# Patient Record
Sex: FEMALE | Race: WHITE | NOT HISPANIC OR LATINO | URBAN - METROPOLITAN AREA
[De-identification: names, ages, dates, MRNs, and addresses within clinical notes are randomized per-mention and may not be internally consistent; named-entity substitution may affect disease eponyms.]

---

## 2017-04-19 PROBLEM — Z00.00 ENCOUNTER FOR PREVENTIVE HEALTH EXAMINATION: Status: ACTIVE | Noted: 2017-04-19

## 2019-03-04 ENCOUNTER — INPATIENT (INPATIENT)
Facility: HOSPITAL | Age: 29
LOS: 1 days | Discharge: HOME | End: 2019-03-06
Attending: OBSTETRICS & GYNECOLOGY | Admitting: OBSTETRICS & GYNECOLOGY
Payer: COMMERCIAL

## 2019-03-04 VITALS — HEART RATE: 93 BPM | DIASTOLIC BLOOD PRESSURE: 73 MMHG | SYSTOLIC BLOOD PRESSURE: 126 MMHG

## 2019-03-04 LAB
AMPHET UR-MCNC: NEGATIVE — SIGNIFICANT CHANGE UP
APPEARANCE UR: ABNORMAL
BACTERIA # UR AUTO: ABNORMAL /HPF
BARBITURATES UR SCN-MCNC: NEGATIVE — SIGNIFICANT CHANGE UP
BASOPHILS # BLD AUTO: 0.04 K/UL — SIGNIFICANT CHANGE UP (ref 0–0.2)
BASOPHILS NFR BLD AUTO: 0.3 % — SIGNIFICANT CHANGE UP (ref 0–1)
BENZODIAZ UR-MCNC: NEGATIVE — SIGNIFICANT CHANGE UP
BILIRUB UR-MCNC: NEGATIVE — SIGNIFICANT CHANGE UP
BLD GP AB SCN SERPL QL: SIGNIFICANT CHANGE UP
COCAINE METAB.OTHER UR-MCNC: NEGATIVE — SIGNIFICANT CHANGE UP
COLOR SPEC: YELLOW — SIGNIFICANT CHANGE UP
DIFF PNL FLD: ABNORMAL
EOSINOPHIL # BLD AUTO: 0.03 K/UL — SIGNIFICANT CHANGE UP (ref 0–0.7)
EOSINOPHIL NFR BLD AUTO: 0.3 % — SIGNIFICANT CHANGE UP (ref 0–8)
EPI CELLS # UR: ABNORMAL /HPF
GLUCOSE UR QL: NEGATIVE MG/DL — SIGNIFICANT CHANGE UP
HCT VFR BLD CALC: 39.1 % — SIGNIFICANT CHANGE UP (ref 37–47)
HGB BLD-MCNC: 13.5 G/DL — SIGNIFICANT CHANGE UP (ref 12–16)
IMM GRANULOCYTES NFR BLD AUTO: 1.1 % — HIGH (ref 0.1–0.3)
KETONES UR-MCNC: NEGATIVE — SIGNIFICANT CHANGE UP
LEUKOCYTE ESTERASE UR-ACNC: ABNORMAL
LYMPHOCYTES # BLD AUTO: 1.54 K/UL — SIGNIFICANT CHANGE UP (ref 1.2–3.4)
LYMPHOCYTES # BLD AUTO: 13 % — LOW (ref 20.5–51.1)
MCHC RBC-ENTMCNC: 30.1 PG — SIGNIFICANT CHANGE UP (ref 27–31)
MCHC RBC-ENTMCNC: 34.5 G/DL — SIGNIFICANT CHANGE UP (ref 32–37)
MCV RBC AUTO: 87.1 FL — SIGNIFICANT CHANGE UP (ref 81–99)
METHADONE UR-MCNC: NEGATIVE — SIGNIFICANT CHANGE UP
MONOCYTES # BLD AUTO: 0.85 K/UL — HIGH (ref 0.1–0.6)
MONOCYTES NFR BLD AUTO: 7.2 % — SIGNIFICANT CHANGE UP (ref 1.7–9.3)
NEUTROPHILS # BLD AUTO: 9.28 K/UL — HIGH (ref 1.4–6.5)
NEUTROPHILS NFR BLD AUTO: 78.1 % — HIGH (ref 42.2–75.2)
NITRITE UR-MCNC: NEGATIVE — SIGNIFICANT CHANGE UP
NRBC # BLD: 0 /100 WBCS — SIGNIFICANT CHANGE UP (ref 0–0)
OPIATES UR-MCNC: NEGATIVE — SIGNIFICANT CHANGE UP
PCP SPEC-MCNC: SIGNIFICANT CHANGE UP
PH UR: 7 — SIGNIFICANT CHANGE UP (ref 5–8)
PLATELET # BLD AUTO: 113 K/UL — LOW (ref 130–400)
PRENATAL SYPHILIS TEST: SIGNIFICANT CHANGE UP
PROPOXYPHENE QUALITATIVE URINE RESULT: NEGATIVE — SIGNIFICANT CHANGE UP
PROT UR-MCNC: 30 MG/DL
RBC # BLD: 4.49 M/UL — SIGNIFICANT CHANGE UP (ref 4.2–5.4)
RBC # FLD: 12.6 % — SIGNIFICANT CHANGE UP (ref 11.5–14.5)
RBC CASTS # UR COMP ASSIST: ABNORMAL /HPF
SP GR SPEC: <=1.005 — SIGNIFICANT CHANGE UP (ref 1.01–1.03)
TYPE + AB SCN PNL BLD: SIGNIFICANT CHANGE UP
UROBILINOGEN FLD QL: 0.2 MG/DL — SIGNIFICANT CHANGE UP (ref 0.2–0.2)
WBC # BLD: 11.87 K/UL — HIGH (ref 4.8–10.8)
WBC # FLD AUTO: 11.87 K/UL — HIGH (ref 4.8–10.8)
WBC UR QL: ABNORMAL /HPF

## 2019-03-04 PROCEDURE — 59400 OBSTETRICAL CARE: CPT

## 2019-03-04 RX ORDER — IBUPROFEN 200 MG
600 TABLET ORAL EVERY 6 HOURS
Qty: 0 | Refills: 0 | Status: DISCONTINUED | OUTPATIENT
Start: 2019-03-04 | End: 2019-03-06

## 2019-03-04 RX ORDER — SODIUM CHLORIDE 9 MG/ML
3 INJECTION INTRAMUSCULAR; INTRAVENOUS; SUBCUTANEOUS EVERY 8 HOURS
Qty: 0 | Refills: 0 | Status: DISCONTINUED | OUTPATIENT
Start: 2019-03-04 | End: 2019-03-06

## 2019-03-04 RX ORDER — DOCUSATE SODIUM 100 MG
100 CAPSULE ORAL
Qty: 0 | Refills: 0 | Status: DISCONTINUED | OUTPATIENT
Start: 2019-03-04 | End: 2019-03-06

## 2019-03-04 RX ORDER — ACETAMINOPHEN 500 MG
650 TABLET ORAL EVERY 6 HOURS
Qty: 0 | Refills: 0 | Status: DISCONTINUED | OUTPATIENT
Start: 2019-03-04 | End: 2019-03-06

## 2019-03-04 RX ORDER — NALOXONE HYDROCHLORIDE 4 MG/.1ML
0.1 SPRAY NASAL
Qty: 0 | Refills: 0 | Status: DISCONTINUED | OUTPATIENT
Start: 2019-03-04 | End: 2019-03-04

## 2019-03-04 RX ORDER — OXYTOCIN 10 UNIT/ML
41.67 VIAL (ML) INJECTION
Qty: 20 | Refills: 0 | Status: DISCONTINUED | OUTPATIENT
Start: 2019-03-04 | End: 2019-03-06

## 2019-03-04 RX ORDER — MAGNESIUM HYDROXIDE 400 MG/1
30 TABLET, CHEWABLE ORAL
Qty: 0 | Refills: 0 | Status: DISCONTINUED | OUTPATIENT
Start: 2019-03-04 | End: 2019-03-06

## 2019-03-04 RX ORDER — LANOLIN
1 OINTMENT (GRAM) TOPICAL EVERY 6 HOURS
Qty: 0 | Refills: 0 | Status: DISCONTINUED | OUTPATIENT
Start: 2019-03-04 | End: 2019-03-06

## 2019-03-04 RX ORDER — OXYCODONE AND ACETAMINOPHEN 5; 325 MG/1; MG/1
2 TABLET ORAL EVERY 6 HOURS
Qty: 0 | Refills: 0 | Status: DISCONTINUED | OUTPATIENT
Start: 2019-03-04 | End: 2019-03-06

## 2019-03-04 RX ORDER — SODIUM CHLORIDE 9 MG/ML
1000 INJECTION, SOLUTION INTRAVENOUS
Qty: 0 | Refills: 0 | Status: DISCONTINUED | OUTPATIENT
Start: 2019-03-04 | End: 2019-03-04

## 2019-03-04 RX ORDER — SODIUM CHLORIDE 9 MG/ML
125 INJECTION, SOLUTION INTRAVENOUS ONCE
Qty: 0 | Refills: 0 | Status: DISCONTINUED | OUTPATIENT
Start: 2019-03-04 | End: 2019-03-04

## 2019-03-04 RX ORDER — AER TRAVELER 0.5 G/1
1 SOLUTION RECTAL; TOPICAL EVERY 4 HOURS
Qty: 0 | Refills: 0 | Status: DISCONTINUED | OUTPATIENT
Start: 2019-03-04 | End: 2019-03-06

## 2019-03-04 RX ORDER — BENZOCAINE 10 %
1 GEL (GRAM) MUCOUS MEMBRANE EVERY 6 HOURS
Qty: 0 | Refills: 0 | Status: DISCONTINUED | OUTPATIENT
Start: 2019-03-04 | End: 2019-03-06

## 2019-03-04 RX ORDER — PRAMOXINE HYDROCHLORIDE 150 MG/15G
1 AEROSOL, FOAM RECTAL EVERY 4 HOURS
Qty: 0 | Refills: 0 | Status: DISCONTINUED | OUTPATIENT
Start: 2019-03-04 | End: 2019-03-06

## 2019-03-04 RX ORDER — SIMETHICONE 80 MG/1
80 TABLET, CHEWABLE ORAL EVERY 6 HOURS
Qty: 0 | Refills: 0 | Status: DISCONTINUED | OUTPATIENT
Start: 2019-03-04 | End: 2019-03-06

## 2019-03-04 RX ORDER — OXYTOCIN 10 UNIT/ML
41.67 VIAL (ML) INJECTION
Qty: 20 | Refills: 0 | Status: DISCONTINUED | OUTPATIENT
Start: 2019-03-04 | End: 2019-03-04

## 2019-03-04 RX ORDER — GLYCERIN ADULT
1 SUPPOSITORY, RECTAL RECTAL AT BEDTIME
Qty: 0 | Refills: 0 | Status: DISCONTINUED | OUTPATIENT
Start: 2019-03-04 | End: 2019-03-06

## 2019-03-04 RX ORDER — OXYTOCIN 10 UNIT/ML
2 VIAL (ML) INJECTION
Qty: 30 | Refills: 0 | Status: DISCONTINUED | OUTPATIENT
Start: 2019-03-04 | End: 2019-03-04

## 2019-03-04 RX ORDER — ONDANSETRON 8 MG/1
4 TABLET, FILM COATED ORAL EVERY 6 HOURS
Qty: 0 | Refills: 0 | Status: DISCONTINUED | OUTPATIENT
Start: 2019-03-04 | End: 2019-03-04

## 2019-03-04 RX ORDER — DIPHENHYDRAMINE HCL 50 MG
25 CAPSULE ORAL EVERY 6 HOURS
Qty: 0 | Refills: 0 | Status: DISCONTINUED | OUTPATIENT
Start: 2019-03-04 | End: 2019-03-06

## 2019-03-04 RX ORDER — DIBUCAINE 1 %
1 OINTMENT (GRAM) RECTAL EVERY 4 HOURS
Qty: 0 | Refills: 0 | Status: DISCONTINUED | OUTPATIENT
Start: 2019-03-04 | End: 2019-03-06

## 2019-03-04 RX ADMIN — Medication 2 MILLIUNIT(S)/MIN: at 15:10

## 2019-03-04 NOTE — OB PROVIDER H&P - HISTORY OF PRESENT ILLNESS
29 yo  at 40w4d w/ SURESH of 2019 by LMP consistent with 1st trimester sonogram here for IOL for PROM. Pt reports LOF that started at 1000, clear, nonodorous, soaked a pad, still leaking. Reports contractions that started last night, irregular in frequency, 4/10 intensity. Denies VB. Feels good FM. No complications in this pregnancy other than gestational thrombocytopenia, which she also had in previous three pregnancies. Last PMD visit was today, VE was done, cervix was 1-2 cm dilated, rupture of membranes was confirmed.

## 2019-03-04 NOTE — CHART NOTE - NSCHARTNOTEFT_GEN_A_CORE
Consulted by OB Resident to evaluate patient for Labor Analgesia, in particular Labor Epidural.  Patient's chart, notes and labs reviewed.  IOL for SROM @ approximately 09:00 AM.                          13.5   11.87 )-----------( 113      ( 04 Mar 2019 13:43 )             39.1     Witnessed, written, informed Anesthesia Consent obtained for Epidural/Spinal/General Anesthesia.

## 2019-03-04 NOTE — OB PROVIDER H&P - NSHPLABSRESULTS_GEN_ALL_CORE
Last platelet count on 2/7/2019 was 82    GCT 92    Sonos:  20w:  grams (35%), afv normal, 3vc, anterior placenta  21w4d: AGA (25%), anterior placenta, afv nl, CL 3.4 cm, anatomy wnl

## 2019-03-04 NOTE — OB PROVIDER DELIVERY SUMMARY - NSPROVIDERDELIVERYNOTE_OBGYN_ALL_OB_FT
Patient was fully dilated and pushing. Fetal head was OA and restituted to LOT. The anterior and posterior shoulders delivered, followed by the remaining body atraumatically. Delayed cord clamping was performed, and then clamped and cut. Cord blood gases collected x2. The  was handed to the mother. The placenta delivered intact with membranes. Pitocin was administered. Uterus massaged, fundus found to be firm. Cervix, vagina and perineum inspected. No lacerations with good hemostasis.     Viable female infant delivered with APGARs 9/9    Laceration:   EBL 300cc

## 2019-03-04 NOTE — OB RN PATIENT PROFILE - FAMILY HISTORY
Father  Still living? Unknown  Family history of hypertension, Age at diagnosis: Age Unknown  Family history of hypercholesterolemia, Age at diagnosis: Age Unknown     Mother  Still living? Unknown  Family history of hypertension, Age at diagnosis: Age Unknown

## 2019-03-04 NOTE — OB PROVIDER H&P - NS_OBGYNHISTORY_OBGYN_ALL_OB_FT
OB Hx: FT  x3, largest baby 7-9lbs, no complications other than gestational thrombocytopenia  GYN Hx: denies h/o fibroids, ovarian cysts, abnormal paps and STIs

## 2019-03-04 NOTE — OB PROVIDER H&P - ASSESSMENT
-Needs MMR PP  -Peds to be made aware regarding thrombocytopenia  -F/u manual count of platelets 29 yo  at 40w4d, GBS neg, PROM at 1000, clear, w/ gestational thrombocytopenia, IOL for PPROM,     -Admit  -Admission labs  -IVF  -Continuous EFM/toco  -Monitor VS per routine  -Pain management prn  -Needs MMR PP  -Peds to be made aware regarding thrombocytopenia  -F/u manual count of platelets    Dr. Busby aware and Dr. Randolph to be made aware.

## 2019-03-04 NOTE — OB PROVIDER H&P - NSHPPHYSICALEXAM_GEN_ALL_CORE
Vital Signs Last 24 Hrs  T(C): 37.2 (04 Mar 2019 13:26), Max: 37.2 (04 Mar 2019 13:26)  T(F): 99 (04 Mar 2019 13:26), Max: 99 (04 Mar 2019 13:26)  HR: 93 (04 Mar 2019 13:26) (93 - 93)  BP: 126/73 (04 Mar 2019 13:26) (126/73 - 126/73)  RR: 18 (04 Mar 2019 13:26) (18 - 18)    Udip: moderate blood   EFM: 140/mod/accel+  [place]: q7-8min  SVE: 1-2 cm dilated in the office today  Abd:   EFW by Leopold's: Vital Signs Last 24 Hrs  T(C): 37.2 (04 Mar 2019 13:26), Max: 37.2 (04 Mar 2019 13:26)  T(F): 99 (04 Mar 2019 13:26), Max: 99 (04 Mar 2019 13:26)  HR: 93 (04 Mar 2019 13:26) (93 - 93)  BP: 126/73 (04 Mar 2019 13:26) (126/73 - 126/73)  RR: 18 (04 Mar 2019 13:26) (18 - 18)    Udip: moderate blood   EFM: 140/mod/accel+  [place]: q7-8min  SVE: 1-2 cm dilated in the office today, vertex by sono  Abd: NT, gravid, no palpable contractions  EFW by Leopold's: 3100 grams

## 2019-03-05 LAB
BASOPHILS # BLD AUTO: 0.06 K/UL — SIGNIFICANT CHANGE UP (ref 0–0.2)
BASOPHILS NFR BLD AUTO: 0.4 % — SIGNIFICANT CHANGE UP (ref 0–1)
EOSINOPHIL # BLD AUTO: 0.06 K/UL — SIGNIFICANT CHANGE UP (ref 0–0.7)
EOSINOPHIL NFR BLD AUTO: 0.4 % — SIGNIFICANT CHANGE UP (ref 0–8)
HCT VFR BLD CALC: 36.9 % — LOW (ref 37–47)
HGB BLD-MCNC: 12.5 G/DL — SIGNIFICANT CHANGE UP (ref 12–16)
IMM GRANULOCYTES NFR BLD AUTO: 0.9 % — HIGH (ref 0.1–0.3)
LYMPHOCYTES # BLD AUTO: 1.83 K/UL — SIGNIFICANT CHANGE UP (ref 1.2–3.4)
LYMPHOCYTES # BLD AUTO: 13.6 % — LOW (ref 20.5–51.1)
MCHC RBC-ENTMCNC: 30 PG — SIGNIFICANT CHANGE UP (ref 27–31)
MCHC RBC-ENTMCNC: 33.9 G/DL — SIGNIFICANT CHANGE UP (ref 32–37)
MCV RBC AUTO: 88.7 FL — SIGNIFICANT CHANGE UP (ref 81–99)
MONOCYTES # BLD AUTO: 1.02 K/UL — HIGH (ref 0.1–0.6)
MONOCYTES NFR BLD AUTO: 7.6 % — SIGNIFICANT CHANGE UP (ref 1.7–9.3)
NEUTROPHILS # BLD AUTO: 10.4 K/UL — HIGH (ref 1.4–6.5)
NEUTROPHILS NFR BLD AUTO: 77.1 % — HIGH (ref 42.2–75.2)
NRBC # BLD: 0 /100 WBCS — SIGNIFICANT CHANGE UP (ref 0–0)
PLATELET # BLD AUTO: 111 K/UL — LOW (ref 130–400)
RBC # BLD: 4.16 M/UL — LOW (ref 4.2–5.4)
RBC # FLD: 12.6 % — SIGNIFICANT CHANGE UP (ref 11.5–14.5)
WBC # BLD: 13.49 K/UL — HIGH (ref 4.8–10.8)
WBC # FLD AUTO: 13.49 K/UL — HIGH (ref 4.8–10.8)

## 2019-03-05 RX ORDER — ZOLPIDEM TARTRATE 10 MG/1
5 TABLET ORAL AT BEDTIME
Qty: 0 | Refills: 0 | Status: DISCONTINUED | OUTPATIENT
Start: 2019-03-05 | End: 2019-03-06

## 2019-03-05 RX ADMIN — SODIUM CHLORIDE 3 MILLILITER(S): 9 INJECTION INTRAMUSCULAR; INTRAVENOUS; SUBCUTANEOUS at 00:42

## 2019-03-05 RX ADMIN — Medication 1 TABLET(S): at 10:54

## 2019-03-05 RX ADMIN — SODIUM CHLORIDE 3 MILLILITER(S): 9 INJECTION INTRAMUSCULAR; INTRAVENOUS; SUBCUTANEOUS at 06:08

## 2019-03-05 RX ADMIN — Medication 600 MILLIGRAM(S): at 09:18

## 2019-03-05 RX ADMIN — Medication 600 MILLIGRAM(S): at 18:57

## 2019-03-05 NOTE — PROGRESS NOTE ADULT - SUBJECTIVE AND OBJECTIVE BOX
OB attending  PPD #1    Pt doing well, pain well controlled. No overnight events, no acute complaints.    Ambulating: Yes  Voiding: Yes  Flatus: Yes  Bowel movements: Yes   Breast or bottle feeding: Breastfeeding  Diet: Regular    PAST MEDICAL & SURGICAL HISTORY:  No pertinent past medical history  No significant past surgical history      Physical Exam  Vital Signs Last 24 Hrs  T(C): 36.6 (05 Mar 2019 15:14), Max: 36.9 (04 Mar 2019 18:34)  T(F): 97.9 (05 Mar 2019 15:14), Max: 98.42 (04 Mar 2019 18:34)  HR: 68 (05 Mar 2019 15:14) (68 - 110)  BP: 110/61 (05 Mar 2019 15:14) (110/58 - 149/67)  RR: 20 (05 Mar 2019 15:14) (18 - 20)  Gen: AAOx3, NAD  Abd: Soft, nontender, nondistended, BS+  Fundus: Firm, below umbilicus  Lochia: normal  Ext: No calf tenderness, no swelling    Labs:                        12.5   13.49 )-----------( 111      ( 05 Mar 2019 11:55 )             36.9     Rh+    A/P: s/p , PPD #1, doing well, one isolated elevated bp, platelet count good, patient with history gestational thrombocytopenia    - continue current management, Ambien prn sleep  -d/c in am   -MMr at d/c

## 2019-03-06 ENCOUNTER — TRANSCRIPTION ENCOUNTER (OUTPATIENT)
Age: 29
End: 2019-03-06

## 2019-03-06 VITALS
HEART RATE: 80 BPM | DIASTOLIC BLOOD PRESSURE: 61 MMHG | SYSTOLIC BLOOD PRESSURE: 113 MMHG | TEMPERATURE: 97 F | RESPIRATION RATE: 20 BRPM

## 2019-03-06 RX ORDER — ACETAMINOPHEN 500 MG
2 TABLET ORAL
Qty: 0 | Refills: 0 | DISCHARGE
Start: 2019-03-06

## 2019-03-06 RX ORDER — IBUPROFEN 200 MG
1 TABLET ORAL
Qty: 0 | Refills: 0 | DISCHARGE
Start: 2019-03-06

## 2019-03-06 RX ADMIN — Medication 600 MILLIGRAM(S): at 11:32

## 2019-03-06 RX ADMIN — Medication 0.5 MILLILITER(S): at 11:19

## 2019-03-06 NOTE — DISCHARGE NOTE OB - MEDICATION SUMMARY - MEDICATIONS TO TAKE
I will START or STAY ON the medications listed below when I get home from the hospital:    acetaminophen 325 mg oral tablet  -- 2 tab(s) by mouth every 6 hours, As needed, Temp greater or equal to 38.5C (101.3F), Mild Pain (1 - 3)  -- Indication: For postpartum    ibuprofen 600 mg oral tablet  -- 1 tab(s) by mouth every 6 hours, As needed, Moderate Pain (4 - 6)  -- Indication: For postpartum

## 2019-03-06 NOTE — PROGRESS NOTE ADULT - SUBJECTIVE AND OBJECTIVE BOX
Subjective:   Patient doing well. No complaints. Minimal lochia. Pain controlled.    Objective:   T(F): 97.3 ( @ 07:22), Max: 97.9 (05 @ 15:14)  HR: 80 ( @ 07:22)  BP: 113/61 (- @ 07:22) (97/56 - 155/99)  RR: 20 ( @ 07:22)  SpO2: --  Gen: AAOx3, NAD  Abd: Soft, Nontender, Nondistended, Fundus firm below the umbilicus  Ext: no tender, mild edema  Min Lochia Rubra    Labs:                        12.5   13.49 )-----------( 111      ( 05 Mar 2019 11:55 )             36.9             Tolerating regular diet  Passed flatus, passed bowel movement  Breast/Bottle feeding    Assessment:   28y s/p , PPD#2, doing well    Plan:  -Routine postpartum care  -Encouraged ambulation and PO hydration  -Tolerating regular diet  -For MMR

## 2019-03-06 NOTE — DISCHARGE NOTE OB - CARE PROVIDER_API CALL
Fletcher Greer)  Obstetrics and Gynecology  [address] [place] [address]  Phone: [phone]  Fax: [phone]  Follow Up Time:

## 2019-03-13 DIAGNOSIS — R03.0 ELEVATED BLOOD-PRESSURE READING, WITHOUT DIAGNOSIS OF HYPERTENSION: ICD-10-CM

## 2019-03-13 DIAGNOSIS — O99.89 OTHER SPECIFIED DISEASES AND CONDITIONS COMPLICATING PREGNANCY, CHILDBIRTH AND THE PUERPERIUM: ICD-10-CM

## 2019-03-13 DIAGNOSIS — Z3A.40 40 WEEKS GESTATION OF PREGNANCY: ICD-10-CM

## 2019-03-13 DIAGNOSIS — D69.6 THROMBOCYTOPENIA, UNSPECIFIED: ICD-10-CM

## 2019-03-13 DIAGNOSIS — O42.92 FULL-TERM PREMATURE RUPTURE OF MEMBRANES, UNSPECIFIED AS TO LENGTH OF TIME BETWEEN RUPTURE AND ONSET OF LABOR: ICD-10-CM

## 2021-11-01 ENCOUNTER — APPOINTMENT (OUTPATIENT)
Dept: ANTEPARTUM | Facility: CLINIC | Age: 31
End: 2021-11-01
Payer: COMMERCIAL

## 2021-11-01 ENCOUNTER — ASOB RESULT (OUTPATIENT)
Age: 31
End: 2021-11-01

## 2021-11-01 PROCEDURE — 76811 OB US DETAILED SNGL FETUS: CPT

## 2022-02-09 NOTE — OB RN PATIENT PROFILE - TOBACCO USE
"Patient Education       Colon Polyps   The Basics   Written by the doctors and editors at [facility]   What are colon polyps? -- Colon polyps are tiny growths that form on the inside of the large intestine (also known as the colon) (figure 1). Polyps are very common. About one-third to one-half of all adults have them by the time they are 50 years old. They do not usually cause symptoms. But some polyps can be or become cancer, so doctors sometimes remove them.  What are the symptoms of colon polyps? -- Colon polyps do not usually cause symptoms.  How do doctors find colon polyps? -- Doctors usually find colon polyps when they are doing screening tests to check for colon or rectal cancer. Cancer screening tests are tests that are done to try and find cancer early, before a person has symptoms. The screening tests for colon and rectal cancer include:  · Colonoscopy - Before having a colonoscopy, you will get medicine to help you relax. Then a doctor will put a thin tube into your anus and advance it into your colon (figure 2). The tube has a camera attached to it, so the doctor can look inside your colon. The tube also has tools on the end, so the doctor can remove pieces of tissue, including polyps. After polyps are removed, they usually go to a lab to be tested for cancer and other problems.  · Sigmoidoscopy - A sigmoidoscopy is very similar to a colonoscopy. The only difference is that this test looks only at the first part of the colon, and a colonoscopy looks at the whole colon.  · CT colonography (also known as virtual colonoscopy) - For a virtual colonoscopy, you have a special kind of X-ray taken, called a "CT scan." This test creates pictures of the colon.  · Stool test - "Stool" is another word for "bowel movements." Stool tests check for blood or abnormal genes in samples of stool. If a stool test indicates that something might be wrong with the colon, doctors usually follow up with a colonoscopy. Then " "doctors find polyps, if they are there.  · Capsule colonoscopy - Rarely, your doctor might do something called a "capsule" colonoscopy. For this test, you swallow a special capsule that contains tiny wireless video cameras.   How are colon polyps treated? -- Doctors remove polyps using the same tools they use for a colonoscopy. They can remove polyps either by snipping them off with a special cutting tool, or by catching the polyps in a noose (figure 3). Most polyps can be removed during a colonoscopy. But sometimes, large polyps need to be removed at a later time, either with another colonoscopy or with surgery.  What happens after I have polyps removed? -- You might need to have a colonoscopy every few years to check for more polyps. In some people polyps come back. And if you had the kind of polyps that could become cancer, your doctor will want to remove them as they appear. Also, if the polyps you had removed were the kind that could become cancer, people in your family might need to be checked for polyps and colon cancer earlier than if you did not have polyps.  Depending on your situation, your doctor might suggest genetic testing. This can show if your polyps are related to a specific gene that runs in families. If this turns out to be the case, they might recommend other tests that can be done to prevent cancer or find it early.  Can colon polyps be prevented? -- To reduce your chances of getting polyps or colon cancer:  · Eat a diet that is low in fat and high in fruits, vegetables, and fiber  · Lose weight, if you are overweight  · Do not smoke  · Limit the amount of alcohol you drink  All topics are updated as new evidence becomes available and our peer review process is complete.  This topic retrieved from [employer] on: Sep 21, 2021.  Topic 90147 Version 8.0  Release: 29.4.2 - C29.263  © 2021 UpToDate, Inc. and/or its affiliates. All rights reserved.  figure 1: Digestive system     This drawing shows the " "organs in the body that process food. Together these organs are called "the digestive system," or "digestive tract." As food travels through this system, the body absorbs nutrients and water.  Graphic 35310 Version 4.0    figure 2: Colonoscopy     During a colonoscopy, you lie on your side and the doctor puts a thin tube with a camera into your anus (from behind). Then the doctor advances the tube into the rectum and colon. The camera sends pictures from inside your colon to a television screen.  Graphic 80218 Version 6.0    figure 3: Removing a colon polyp     One way doctors remove colon polyps is to use a noose as a tool. They loop a wire around the polyp and squeeze the loop tight. When the polyp comes off, the doctor sucks it up into the endoscope, so that it can go to the lab for tests.  Graphic 35293 Version 5.0    Consumer Information Use and Disclaimer   This information is not specific medical advice and does not replace information you receive from your health care provider. This is only a brief summary of general information. It does NOT include all information about conditions, illnesses, injuries, tests, procedures, treatments, therapies, discharge instructions or life-style choices that may apply to you. You must talk with your health care provider for complete information about your health and treatment options. This information should not be used to decide whether or not to accept your health care provider's advice, instructions or recommendations. Only your health care provider has the knowledge and training to provide advice that is right for you. The use of this information is governed by the [employer] End User License Agreement, available at https://www.[employer].PlumTV/en/solutions/Yuenimei/about/nelly.The use of [employer] content is governed by the [employer] Terms of Use. ©2021 UpToDate, Inc. All rights reserved.  Copyright   © 2021 UpToDate, Inc. and/or its affiliates. All rights " reserved.  Patient Education       High Fiber Diet   About this topic   Dietary fiber helps many illnesses. It can help you if you cannot have a bowel movement or if you have loose stools. Fiber can also lower your risk of diabetes and heart disease. Fiber can help with weight loss by helping you feel roberts after meals.  You can find fiber in fruits, vegetables, nuts and seeds, whole grains, and legumes. The fiber is the part of the plant food that your body cannot break down and absorb. It passes through your stomach, small bowel, colon, and out your body.  There are two kinds of fiber: Insoluble and soluble fiber. Insoluble fiber helps you pass foods through your digestive system. Insoluble fiber can help you with hard stools. Soluble fiber draws water in and turns it into a gel-like form making digestion slow down. Both are important.       What will the results be?   A high fiber diet can help you with bowel problems like stools that are too hard or too loose. It can also help prevent hemorrhoids and other colon problems. A high fiber diet can also help control your weight and lower blood sugar and cholesterol levels.  What changes to diet are needed?   · The amount of fiber you need is based on your age, gender, and health.  · Try to get 20 to 35 grams of fiber in your diet each day. Most people in the [place] only eat 15 grams of fiber daily.  · Drink at least 8 cups (1920 mL) of fluid each day.  When is this diet used?   Your doctor may talk with you about this diet if you have belly problems.  Who should use this diet?   Older children, young people, and adults can have this diet.   Who should not use this diet?   Some people should not use this diet. Check with your doctor if you have:  · Diverticulitis  · Active Crohn's disease  · Ulcerative colitis  · Bowel inflammation  · Certain types of GI surgery  Talk to your child's doctor before starting your child on a high fiber diet.  What foods are good to eat?    To get the most from fiber in your diet, eat a wide variety of high fiber foods. Some examples are:  · Vegetables like:  ? Spinach  ? Peas  ? Artichoke  ? Sweet potatoes with skin  ? Broccoli  · Fruits like:  ? Raspberries  ? Blueberries  ? Blackberries  ? Apples with skin  ? Dried fruits  · Grains like:  ? Oat bran  ? Barley  ? Whole wheat products  ? Wheat bran  · Dried beans and nuts like:  ? Sunflower seeds  ? Almonds  ? Black beans  ? Chickpeas  What problems could happen?   · Sudden increase of fiber intake can lead to gas, pain, fullness in your belly, and loose stools. Increase fiber gradually while drinking plenty of fluids.  · Do not eat too much fiber. Your body will not take in vitamins and minerals as well if you eat too much fiber.  When do I need to call the doctor?   Health problem is not better or you are feeling worse.  Helpful tips   · Start slow as you add more fiber to your diet. This may help prevent gas or cramps.  · Try to eat the same amount of fiber each day. Aim to get your fiber from nutritious foods. Supplements do not offer the same benefits as food.  · Read food labels with care to learn how much fiber is in the food you are eating.  · If possible, do not peel fruits or vegetables before you eat them. Eating the peel gives you more fiber.  Where can I learn more?   Eat Right  https://www.eatright.org/food/vitamins-and-supplements/types-of-vitamins-and-nutrients/easy-ways-to-boost-fiber-in-your-daily-diet   Last Reviewed Date   2021-09-23  Consumer Information Use and Disclaimer   This information is not specific medical advice and does not replace information you receive from your health care provider. This is only a brief summary of general information. It does NOT include all information about conditions, illnesses, injuries, tests, procedures, treatments, therapies, discharge instructions or life-style choices that may apply to you. You must talk with your health care provider for  "complete information about your health and treatment options. This information should not be used to decide whether or not to accept your health care providers advice, instructions or recommendations. Only your health care provider has the knowledge and training to provide advice that is right for you.  Copyright   Copyright © 2021 UpToDate, Inc. and its affiliates and/or licensors. All rights reserved.  Patient Education       Diverticulosis Discharge Instructions   About this topic   Diverticulosis is a problem of the large bowel or colon. The wall of the bowel becomes weak and pushes outward. They form balloon-like pouches called diverticula or "tics." When you have hard stool, you strain to have a bowel movement. This raises the pressure in the bowel and causes pouches or bulges to form. Most often, they do not cause a problem. If they become infected, you have diverticulitis. If you have both bleeding and infection it is diverticular disease.     What care is needed at home?   · Ask your doctor what you need to do when you go home. Make sure you ask questions if you do not understand what the doctor says.  · Eat more whole grains, vegetables, and fruits.  · Do not wait to have a bowel movement. Go as soon as you have the urge.  · Drink 8 to 10 glasses of water each day. Talk to your doctor if you are drinking less fluids due to a health problem.  · Be active. Walk, garden, or do something active for 30 minutes or more on most days of the week.  What follow-up care is needed?   Your doctor may ask you to make visits to the office to check on your progress. Be sure to keep these visits.  What drugs may be needed?   Most often with diverticulosis you will not need to take any drugs.  Will physical activity be limited?   When you are in pain, you may need to rest in bed. To ease the pain, use a heat compress on your belly. This should last only for a few days.  What changes to diet are needed?   Talk to your " doctor about any changes you need to make to your diet.  · You do not need to avoid seeds, nuts, corn, or other similar foods.   · You will need to eat food rich in fiber and drink more water.  ? Eat 5 or more servings of fresh fruits and vegetables every day.  ? Eat 6 or more servings of whole-wheat grain breads and cereals.  ? Try to get 25 to 30 grams of fiber every day. Read the labels to learn how much fiber is in foods.   · Do not drink coffee, tea, or beer, wine, and mixed drinks (alcohol).  What problems could happen?   You may develop diverticulitis, which may cause:  · Pockets or pouches in your bowel may be infected or filled with pus.  · Hole or tear in your bowel  · Part of your bowel to become narrow  · You to need surgery  What can be done to prevent this health problem?   The best way to keep from having diverticulosis is to keep your bowel movements soft and normal. To keep more pouches from forming:  · Talk with your doctor about adding an over-the-counter (OTC) fiber product to keep your stools soft.  · Limit how much pain drugs you take. Overuse of some pain drugs can cause hard stools; talk with your doctor.  When do I need to call the doctor?   · Signs of infection. These include a fever of 100.4°F (38°C) or higher, chills.  · Mild pain or cramping in the lower part of the belly  · A feeling of bloating in the belly  · Belly pain that gets worse  · Blood in your stool  · Upset stomach or throwing up  · Stools get too loose or too hard  · Long-term hard stools  Teach Back: Helping You Understand   The Teach Back Method helps you understand the information we are giving you. After you talk with the staff, tell them in your own words what you learned. This helps to make sure the staff has described each thing clearly. It also helps to explain things that may have been confusing. Before going home, make sure you are able to do these:  · I can tell you about my condition.  · I can tell you what  changes I need to make with my diet or drugs.  · I can tell you what I will do if I have pain or cramping in my lower belly or I have more belly pain.  Where can I learn more?   FamilyDoctor.org  http://familydoctor.org/familydoctor/en/diseases-conditions/diverticular-disease.html   [facility]  https://www.nhs.uk/conditions/diverticular-disease-and-diverticulitis/   Last Reviewed Date   2021-04-13  Consumer Information Use and Disclaimer   This information is not specific medical advice and does not replace information you receive from your health care provider. This is only a brief summary of general information. It does NOT include all information about conditions, illnesses, injuries, tests, procedures, treatments, therapies, discharge instructions or life-style choices that may apply to you. You must talk with your health care provider for complete information about your health and treatment options. This information should not be used to decide whether or not to accept your health care providers advice, instructions or recommendations. Only your health care provider has the knowledge and training to provide advice that is right for you.  Copyright   Copyright © 2021 UpToDate, Inc. and its affiliates and/or licensors. All rights reserved.     Never smoker

## 2022-03-21 ENCOUNTER — INPATIENT (INPATIENT)
Facility: HOSPITAL | Age: 32
LOS: 0 days | Discharge: HOME | End: 2022-03-22
Attending: OBSTETRICS & GYNECOLOGY | Admitting: OBSTETRICS & GYNECOLOGY
Payer: MEDICAID

## 2022-03-21 VITALS — HEART RATE: 96 BPM | SYSTOLIC BLOOD PRESSURE: 135 MMHG | TEMPERATURE: 99 F | DIASTOLIC BLOOD PRESSURE: 80 MMHG

## 2022-03-21 LAB
AMPHET UR-MCNC: NEGATIVE — SIGNIFICANT CHANGE UP
APPEARANCE UR: ABNORMAL
BACTERIA # UR AUTO: SIGNIFICANT CHANGE UP
BARBITURATES UR SCN-MCNC: NEGATIVE — SIGNIFICANT CHANGE UP
BASOPHILS # BLD AUTO: 0.06 K/UL — SIGNIFICANT CHANGE UP (ref 0–0.2)
BASOPHILS NFR BLD AUTO: 0.5 % — SIGNIFICANT CHANGE UP (ref 0–1)
BENZODIAZ UR-MCNC: NEGATIVE — SIGNIFICANT CHANGE UP
BILIRUB UR-MCNC: NEGATIVE — SIGNIFICANT CHANGE UP
BLD GP AB SCN SERPL QL: SIGNIFICANT CHANGE UP
BUPRENORPHINE SCREEN, URINE RESULT: NEGATIVE — SIGNIFICANT CHANGE UP
COCAINE METAB.OTHER UR-MCNC: NEGATIVE — SIGNIFICANT CHANGE UP
COLOR SPEC: SIGNIFICANT CHANGE UP
DIFF PNL FLD: NEGATIVE — SIGNIFICANT CHANGE UP
EOSINOPHIL # BLD AUTO: 0.03 K/UL — SIGNIFICANT CHANGE UP (ref 0–0.7)
EOSINOPHIL NFR BLD AUTO: 0.2 % — SIGNIFICANT CHANGE UP (ref 0–8)
EPI CELLS # UR: 25 /HPF — HIGH (ref 0–5)
FENTANYL UR QL: NEGATIVE — SIGNIFICANT CHANGE UP
GLUCOSE UR QL: NEGATIVE — SIGNIFICANT CHANGE UP
HCT VFR BLD CALC: 42 % — SIGNIFICANT CHANGE UP (ref 37–47)
HGB BLD-MCNC: 14.2 G/DL — SIGNIFICANT CHANGE UP (ref 12–16)
IMM GRANULOCYTES NFR BLD AUTO: 1.7 % — HIGH (ref 0.1–0.3)
KETONES UR-MCNC: NEGATIVE — SIGNIFICANT CHANGE UP
L&D DRUG SCREEN, URINE: SIGNIFICANT CHANGE UP
LEUKOCYTE ESTERASE UR-ACNC: ABNORMAL
LYMPHOCYTES # BLD AUTO: 1.28 K/UL — SIGNIFICANT CHANGE UP (ref 1.2–3.4)
LYMPHOCYTES # BLD AUTO: 9.8 % — LOW (ref 20.5–51.1)
MCHC RBC-ENTMCNC: 29.4 PG — SIGNIFICANT CHANGE UP (ref 27–31)
MCHC RBC-ENTMCNC: 33.8 G/DL — SIGNIFICANT CHANGE UP (ref 32–37)
MCV RBC AUTO: 87 FL — SIGNIFICANT CHANGE UP (ref 81–99)
METHADONE UR-MCNC: NEGATIVE — SIGNIFICANT CHANGE UP
MONOCYTES # BLD AUTO: 0.97 K/UL — HIGH (ref 0.1–0.6)
MONOCYTES NFR BLD AUTO: 7.4 % — SIGNIFICANT CHANGE UP (ref 1.7–9.3)
NEUTROPHILS # BLD AUTO: 10.54 K/UL — HIGH (ref 1.4–6.5)
NEUTROPHILS NFR BLD AUTO: 80.4 % — HIGH (ref 42.2–75.2)
NITRITE UR-MCNC: NEGATIVE — SIGNIFICANT CHANGE UP
NRBC # BLD: 0 /100 WBCS — SIGNIFICANT CHANGE UP (ref 0–0)
OPIATES UR-MCNC: NEGATIVE — SIGNIFICANT CHANGE UP
OXYCODONE UR-MCNC: NEGATIVE — SIGNIFICANT CHANGE UP
PCP UR-MCNC: NEGATIVE — SIGNIFICANT CHANGE UP
PH UR: 7 — SIGNIFICANT CHANGE UP (ref 5–8)
PLATELET # BLD AUTO: 104 K/UL — LOW (ref 130–400)
PRENATAL SYPHILIS TEST: SIGNIFICANT CHANGE UP
PROPOXYPHENE QUALITATIVE URINE RESULT: NEGATIVE — SIGNIFICANT CHANGE UP
PROT UR-MCNC: SIGNIFICANT CHANGE UP
RBC # BLD: 4.83 M/UL — SIGNIFICANT CHANGE UP (ref 4.2–5.4)
RBC # FLD: 12.8 % — SIGNIFICANT CHANGE UP (ref 11.5–14.5)
RBC CASTS # UR COMP ASSIST: 2 /HPF — SIGNIFICANT CHANGE UP (ref 0–4)
SARS-COV-2 RNA SPEC QL NAA+PROBE: SIGNIFICANT CHANGE UP
SP GR SPEC: 1.01 — SIGNIFICANT CHANGE UP (ref 1.01–1.03)
UROBILINOGEN FLD QL: SIGNIFICANT CHANGE UP
WBC # BLD: 13.1 K/UL — HIGH (ref 4.8–10.8)
WBC # FLD AUTO: 13.1 K/UL — HIGH (ref 4.8–10.8)
WBC UR QL: 30 /HPF — HIGH (ref 0–5)

## 2022-03-21 PROCEDURE — 59409 OBSTETRICAL CARE: CPT | Mod: U9

## 2022-03-21 RX ORDER — SODIUM CHLORIDE 9 MG/ML
1000 INJECTION, SOLUTION INTRAVENOUS
Refills: 0 | Status: DISCONTINUED | OUTPATIENT
Start: 2022-03-21 | End: 2022-03-21

## 2022-03-21 RX ORDER — OXYTOCIN 10 UNIT/ML
333.33 VIAL (ML) INJECTION
Qty: 20 | Refills: 0 | Status: DISCONTINUED | OUTPATIENT
Start: 2022-03-21 | End: 2022-03-22

## 2022-03-21 RX ORDER — HYDROCORTISONE 1 %
1 OINTMENT (GRAM) TOPICAL EVERY 6 HOURS
Refills: 0 | Status: DISCONTINUED | OUTPATIENT
Start: 2022-03-21 | End: 2022-03-22

## 2022-03-21 RX ORDER — KETOROLAC TROMETHAMINE 30 MG/ML
30 SYRINGE (ML) INJECTION ONCE
Refills: 0 | Status: DISCONTINUED | OUTPATIENT
Start: 2022-03-21 | End: 2022-03-21

## 2022-03-21 RX ORDER — AER TRAVELER 0.5 G/1
1 SOLUTION RECTAL; TOPICAL EVERY 4 HOURS
Refills: 0 | Status: DISCONTINUED | OUTPATIENT
Start: 2022-03-21 | End: 2022-03-22

## 2022-03-21 RX ORDER — BENZOCAINE 10 %
1 GEL (GRAM) MUCOUS MEMBRANE EVERY 6 HOURS
Refills: 0 | Status: DISCONTINUED | OUTPATIENT
Start: 2022-03-21 | End: 2022-03-22

## 2022-03-21 RX ORDER — CITRIC ACID/SODIUM CITRATE 300-500 MG
15 SOLUTION, ORAL ORAL EVERY 6 HOURS
Refills: 0 | Status: DISCONTINUED | OUTPATIENT
Start: 2022-03-21 | End: 2022-03-21

## 2022-03-21 RX ORDER — ONDANSETRON 8 MG/1
4 TABLET, FILM COATED ORAL EVERY 6 HOURS
Refills: 0 | Status: DISCONTINUED | OUTPATIENT
Start: 2022-03-21 | End: 2022-03-21

## 2022-03-21 RX ORDER — IBUPROFEN 200 MG
600 TABLET ORAL EVERY 6 HOURS
Refills: 0 | Status: DISCONTINUED | OUTPATIENT
Start: 2022-03-21 | End: 2022-03-22

## 2022-03-21 RX ORDER — NALOXONE HYDROCHLORIDE 4 MG/.1ML
0.1 SPRAY NASAL
Refills: 0 | Status: DISCONTINUED | OUTPATIENT
Start: 2022-03-21 | End: 2022-03-21

## 2022-03-21 RX ORDER — IBUPROFEN 200 MG
600 TABLET ORAL EVERY 6 HOURS
Refills: 0 | Status: COMPLETED | OUTPATIENT
Start: 2022-03-21 | End: 2023-02-17

## 2022-03-21 RX ORDER — TETANUS TOXOID, REDUCED DIPHTHERIA TOXOID AND ACELLULAR PERTUSSIS VACCINE, ADSORBED 5; 2.5; 8; 8; 2.5 [IU]/.5ML; [IU]/.5ML; UG/.5ML; UG/.5ML; UG/.5ML
0.5 SUSPENSION INTRAMUSCULAR ONCE
Refills: 0 | Status: DISCONTINUED | OUTPATIENT
Start: 2022-03-21 | End: 2022-03-22

## 2022-03-21 RX ORDER — SIMETHICONE 80 MG/1
80 TABLET, CHEWABLE ORAL EVERY 4 HOURS
Refills: 0 | Status: DISCONTINUED | OUTPATIENT
Start: 2022-03-21 | End: 2022-03-22

## 2022-03-21 RX ORDER — OXYCODONE HYDROCHLORIDE 5 MG/1
5 TABLET ORAL
Refills: 0 | Status: DISCONTINUED | OUTPATIENT
Start: 2022-03-21 | End: 2022-03-22

## 2022-03-21 RX ORDER — SODIUM CHLORIDE 9 MG/ML
3 INJECTION INTRAMUSCULAR; INTRAVENOUS; SUBCUTANEOUS EVERY 8 HOURS
Refills: 0 | Status: DISCONTINUED | OUTPATIENT
Start: 2022-03-21 | End: 2022-03-22

## 2022-03-21 RX ORDER — MAGNESIUM HYDROXIDE 400 MG/1
30 TABLET, CHEWABLE ORAL
Refills: 0 | Status: DISCONTINUED | OUTPATIENT
Start: 2022-03-21 | End: 2022-03-22

## 2022-03-21 RX ORDER — DIPHENHYDRAMINE HCL 50 MG
25 CAPSULE ORAL EVERY 6 HOURS
Refills: 0 | Status: DISCONTINUED | OUTPATIENT
Start: 2022-03-21 | End: 2022-03-22

## 2022-03-21 RX ORDER — DEXAMETHASONE 0.5 MG/5ML
4 ELIXIR ORAL EVERY 6 HOURS
Refills: 0 | Status: DISCONTINUED | OUTPATIENT
Start: 2022-03-21 | End: 2022-03-21

## 2022-03-21 RX ORDER — FENTANYL/BUPIVACAINE/NS/PF 2MCG/ML-.1
250 PLASTIC BAG, INJECTION (ML) INJECTION
Refills: 0 | Status: DISCONTINUED | OUTPATIENT
Start: 2022-03-21 | End: 2022-03-21

## 2022-03-21 RX ORDER — ACETAMINOPHEN 500 MG
975 TABLET ORAL
Refills: 0 | Status: DISCONTINUED | OUTPATIENT
Start: 2022-03-21 | End: 2022-03-22

## 2022-03-21 RX ORDER — DIBUCAINE 1 %
1 OINTMENT (GRAM) RECTAL EVERY 6 HOURS
Refills: 0 | Status: DISCONTINUED | OUTPATIENT
Start: 2022-03-21 | End: 2022-03-22

## 2022-03-21 RX ORDER — OXYCODONE HYDROCHLORIDE 5 MG/1
5 TABLET ORAL ONCE
Refills: 0 | Status: DISCONTINUED | OUTPATIENT
Start: 2022-03-21 | End: 2022-03-22

## 2022-03-21 RX ORDER — OXYTOCIN 10 UNIT/ML
333.33 VIAL (ML) INJECTION
Qty: 20 | Refills: 0 | Status: DISCONTINUED | OUTPATIENT
Start: 2022-03-21 | End: 2022-03-21

## 2022-03-21 RX ORDER — LANOLIN
1 OINTMENT (GRAM) TOPICAL EVERY 6 HOURS
Refills: 0 | Status: DISCONTINUED | OUTPATIENT
Start: 2022-03-21 | End: 2022-03-22

## 2022-03-21 RX ORDER — INFLUENZA VIRUS VACCINE 15; 15; 15; 15 UG/.5ML; UG/.5ML; UG/.5ML; UG/.5ML
0.5 SUSPENSION INTRAMUSCULAR ONCE
Refills: 0 | Status: DISCONTINUED | OUTPATIENT
Start: 2022-03-21 | End: 2022-03-21

## 2022-03-21 RX ADMIN — Medication 600 MILLIGRAM(S): at 23:55

## 2022-03-21 RX ADMIN — Medication 1000 MILLIUNIT(S)/MIN: at 15:03

## 2022-03-21 RX ADMIN — Medication 600 MILLIGRAM(S): at 23:28

## 2022-03-21 RX ADMIN — Medication 30 MILLIGRAM(S): at 15:02

## 2022-03-21 NOTE — OB PROVIDER H&P - ASSESSMENT
29yo  at 40w6d [place], GBS neg, h/o gestational thrombocytopenia last platelets 91 on , with contractions, in active labor  -admit to L&D  -continuous EFM and toco  -vitals and labs  -pain management PRN  -f/u Covid pcr  -IVF hydration, Clear liquid diet    Dr. Ramírez and Dr. Beaulieu aware 30yo  at 40w6d [place], GBS neg, h/o gestational thrombocytopenia last platelets 91 on , with contractions, in active labor  -admit to L&D  -continuous EFM and toco  -vitals and labs  -pain management PRN  -f/u Covid pcr  -IVF hydration, Clear liquid diet    Dr. Ramírez and Dr. Beaulieu aware

## 2022-03-21 NOTE — OB RN DELIVERY SUMMARY - NSSELHIDDEN_OBGYN_ALL_OB_FT
[NS_DeliveryAttending2_OBGYN_ALL_OB_FT:[email or web address]=],[NS_DeliveryRN_OBGYN_ALL_OB_FT:[id number]=] [NS_DeliveryAttending2_OBGYN_ALL_OB_FT:[email or web address]=],[NS_DeliveryRN_OBGYN_ALL_OB_FT:[id number]=],[NS_DeliveryAttending1_OBGYN_ALL_OB_FT:[email or web address]=]

## 2022-03-21 NOTE — OB PROVIDER H&P - NSICDXFAMILYHX_GEN_ALL_CORE_FT
FAMILY HISTORY:  Father  Still living? Unknown  Family history of hypercholesterolemia, Age at diagnosis: Age Unknown  Family history of hypertension, Age at diagnosis: Age Unknown    Mother  Still living? Unknown  Family history of hypertension, Age at diagnosis: Age Unknown

## 2022-03-21 NOTE — OB RN PATIENT PROFILE - GRAVIDA, OB PROFILE
Richa HATFIELD   Received: Today   Message Contents   SOSA [facility] Nurse Msg Pool; URSZULA Blanco [facility] Nurse Msg Pool Cc: Linnette Lamas             A colonoscopy was ordered for pt to schedule. Our attempts to reach the pt by phone have been unsuccessful but a Contact letter has been mailed.     Thanks,   GI Preadmit Scheduling Dept         5

## 2022-03-21 NOTE — OB PROVIDER H&P - NSHPPHYSICALEXAM_GEN_ALL_CORE
T(C): 36.9 (03-21-22 @ 08:29), Max: 37.1 (03-21-22 @ 08:22)  HR: 96 (03-21-22 @ 08:29) (96 - 96)  BP: 135/80 (03-21-22 @ 08:29) (135/80 - 135/80)  RR: 14 (03-21-22 @ 08:29) (14 - 14)  BMI (kg/m2): 30.3 (03-21-22 @ 08:29)    Gen: A+OX3. NAD  Abd: Soft, Nontender. Gravid. palpable contractions  FHR: 155bpm/moderate variability/+accelerations/no decelerations  TOCO:q 3-4mins  SVE: 6/80/-1 vtx intact per Dr. talia HERNANDEZ by [facility]: 3200gms T(C): 36.9 (03-21-22 @ 08:29), Max: 37.1 (03-21-22 @ 08:22)  HR: 96 (03-21-22 @ 08:29) (96 - 96)  BP: 135/80 (03-21-22 @ 08:29) (135/80 - 135/80)  RR: 14 (03-21-22 @ 08:29) (14 - 14)  BMI (kg/m2): 30.3 (03-21-22 @ 08:29)    Gen: A+OX3. NAD  Abd: Soft, Nontender. Gravid. palpable contractions  FHR: 155bpm/moderate variability/+accelerations/no decelerations  TOCO:q 3-4mins  SVE: 6/90/-1 vtx intact per Dr. talia HERNANDEZ by Le[facility]: 3200gms

## 2022-03-21 NOTE — OB PROVIDER DELIVERY SUMMARY - NSPROVIDERDELIVERYNOTE_OBGYN_ALL_OB_FT
Pt delivered a viable female infant over intact preneium  placenta delivered intact and spont  pt stable mild lochia   uterus firm   no lacerations  peds present for delivery  baby to reg nursery  pt stable .

## 2022-03-21 NOTE — OB PROVIDER H&P - ATTENDING COMMENTS
Pt is a 31yo  at 40w6d GA, GBS neg, h/o gestational thrombocytopenia last platelets 91 on , with contractions, in active labor, Cvx 6cm/90%, Fhr Cat1, efw 3200gm   -admit to L&D  -continuous EFM and toco  -vitals and labs  -pain management PRN  -f/u Covid pcr  -IVF hydration, Clear liquid   -manual plt count and then for epidural

## 2022-03-21 NOTE — OB PROVIDER DELIVERY SUMMARY - NSSELHIDDEN_OBGYN_ALL_OB_FT
[NS_DeliveryAttending2_OBGYN_ALL_OB_FT:[email or web address]=],[NS_DeliveryRN_OBGYN_ALL_OB_FT:[email or web address]=],[NS_DeliveryAttending1_OBGYN_ALL_OB_FT:[email or web address]=]

## 2022-03-21 NOTE — OB PROVIDER H&P - HISTORY OF PRESENT ILLNESS
31yo  at 40w6d EDC 3/15/22 by LMP c/w 1st trimester sonogram presenting with contractions since this AM, q5mins. Denies LOF, VB. Good fetal movement. Pregnancy complicated by gestational thrombocytopenia last platelets on . No other complications this pregnancy. GBS neg 32yo  at 40w6d EDC 3/15/22 by LMP c/w 1st trimester sonogram presenting with contractions since this AM, q5mins. Denies LOF, VB. Good fetal movement. Pregnancy complicated by gestational thrombocytopenia last platelets on . No other complications this pregnancy. GBS neg

## 2022-03-21 NOTE — OB PROVIDER H&P - NSHPLABSRESULTS_GEN_ALL_CORE
2/17  GBS neg  HIV NR  RPR neg  platelets 91    GCT: 76    9/2  Measles immune  mumps immunerubella immune  platelets 105  varicella immune  RPR neg  Bpos  HIV NR    @20w6d: breech, anterior placenta, ROSANNE: NL, EFW: 403gm, NL anatomy

## 2022-03-22 ENCOUNTER — TRANSCRIPTION ENCOUNTER (OUTPATIENT)
Age: 32
End: 2022-03-22

## 2022-03-22 VITALS
SYSTOLIC BLOOD PRESSURE: 120 MMHG | DIASTOLIC BLOOD PRESSURE: 70 MMHG | HEART RATE: 91 BPM | TEMPERATURE: 97 F | RESPIRATION RATE: 18 BRPM

## 2022-03-22 LAB
BASOPHILS # BLD AUTO: 0.03 K/UL — SIGNIFICANT CHANGE UP (ref 0–0.2)
BASOPHILS # BLD AUTO: 0.06 K/UL — SIGNIFICANT CHANGE UP (ref 0–0.2)
BASOPHILS NFR BLD AUTO: 0.3 % — SIGNIFICANT CHANGE UP (ref 0–1)
BASOPHILS NFR BLD AUTO: 0.5 % — SIGNIFICANT CHANGE UP (ref 0–1)
EOSINOPHIL # BLD AUTO: 0.07 K/UL — SIGNIFICANT CHANGE UP (ref 0–0.7)
EOSINOPHIL # BLD AUTO: 0.14 K/UL — SIGNIFICANT CHANGE UP (ref 0–0.7)
EOSINOPHIL NFR BLD AUTO: 0.7 % — SIGNIFICANT CHANGE UP (ref 0–8)
EOSINOPHIL NFR BLD AUTO: 1.1 % — SIGNIFICANT CHANGE UP (ref 0–8)
HCT VFR BLD CALC: 36.2 % — LOW (ref 37–47)
HCT VFR BLD CALC: 39.4 % — SIGNIFICANT CHANGE UP (ref 37–47)
HGB BLD-MCNC: 11.9 G/DL — LOW (ref 12–16)
HGB BLD-MCNC: 13 G/DL — SIGNIFICANT CHANGE UP (ref 12–16)
IMM GRANULOCYTES NFR BLD AUTO: 1.2 % — HIGH (ref 0.1–0.3)
IMM GRANULOCYTES NFR BLD AUTO: 1.2 % — HIGH (ref 0.1–0.3)
LYMPHOCYTES # BLD AUTO: 1.13 K/UL — LOW (ref 1.2–3.4)
LYMPHOCYTES # BLD AUTO: 11.2 % — LOW (ref 20.5–51.1)
LYMPHOCYTES # BLD AUTO: 17.2 % — LOW (ref 20.5–51.1)
LYMPHOCYTES # BLD AUTO: 2.09 K/UL — SIGNIFICANT CHANGE UP (ref 1.2–3.4)
MCHC RBC-ENTMCNC: 29.1 PG — SIGNIFICANT CHANGE UP (ref 27–31)
MCHC RBC-ENTMCNC: 29.5 PG — SIGNIFICANT CHANGE UP (ref 27–31)
MCHC RBC-ENTMCNC: 32.9 G/DL — SIGNIFICANT CHANGE UP (ref 32–37)
MCHC RBC-ENTMCNC: 33 G/DL — SIGNIFICANT CHANGE UP (ref 32–37)
MCV RBC AUTO: 88.5 FL — SIGNIFICANT CHANGE UP (ref 81–99)
MCV RBC AUTO: 89.5 FL — SIGNIFICANT CHANGE UP (ref 81–99)
MONOCYTES # BLD AUTO: 0.71 K/UL — HIGH (ref 0.1–0.6)
MONOCYTES # BLD AUTO: 1.1 K/UL — HIGH (ref 0.1–0.6)
MONOCYTES NFR BLD AUTO: 7 % — SIGNIFICANT CHANGE UP (ref 1.7–9.3)
MONOCYTES NFR BLD AUTO: 9 % — SIGNIFICANT CHANGE UP (ref 1.7–9.3)
NEUTROPHILS # BLD AUTO: 8.02 K/UL — HIGH (ref 1.4–6.5)
NEUTROPHILS # BLD AUTO: 8.64 K/UL — HIGH (ref 1.4–6.5)
NEUTROPHILS NFR BLD AUTO: 71 % — SIGNIFICANT CHANGE UP (ref 42.2–75.2)
NEUTROPHILS NFR BLD AUTO: 79.6 % — HIGH (ref 42.2–75.2)
NRBC # BLD: 0 /100 WBCS — SIGNIFICANT CHANGE UP (ref 0–0)
NRBC # BLD: 0 /100 WBCS — SIGNIFICANT CHANGE UP (ref 0–0)
PLATELET # BLD AUTO: 77 K/UL — LOW (ref 130–400)
PLATELET # BLD AUTO: 87 K/UL — LOW (ref 130–400)
RBC # BLD: 4.09 M/UL — LOW (ref 4.2–5.4)
RBC # BLD: 4.4 M/UL — SIGNIFICANT CHANGE UP (ref 4.2–5.4)
RBC # FLD: 12.9 % — SIGNIFICANT CHANGE UP (ref 11.5–14.5)
RBC # FLD: 13 % — SIGNIFICANT CHANGE UP (ref 11.5–14.5)
WBC # BLD: 10.08 K/UL — SIGNIFICANT CHANGE UP (ref 4.8–10.8)
WBC # BLD: 12.18 K/UL — HIGH (ref 4.8–10.8)
WBC # FLD AUTO: 10.08 K/UL — SIGNIFICANT CHANGE UP (ref 4.8–10.8)
WBC # FLD AUTO: 12.18 K/UL — HIGH (ref 4.8–10.8)

## 2022-03-22 RX ORDER — IBUPROFEN 200 MG
1 TABLET ORAL
Qty: 0 | Refills: 0 | DISCHARGE
Start: 2022-03-22

## 2022-03-22 RX ADMIN — Medication 600 MILLIGRAM(S): at 06:19

## 2022-03-22 RX ADMIN — SODIUM CHLORIDE 3 MILLILITER(S): 9 INJECTION INTRAMUSCULAR; INTRAVENOUS; SUBCUTANEOUS at 06:10

## 2022-03-22 RX ADMIN — Medication 600 MILLIGRAM(S): at 06:15

## 2022-03-22 NOTE — PROGRESS NOTE ADULT - SUBJECTIVE AND OBJECTIVE BOX
OB attending  PPD #1    Pt doing well, pain well controlled. No overnight events, no acute complaints.    Ambulating: Yes  Voiding: Yes  Flatus: Yes  Bowel movements: Yes   Breast or bottle feeding: Breastfeeding  Diet: Regular    PAST MEDICAL & SURGICAL HISTORY:  Gestational thrombocytopenia    No significant past surgical history        Physical Exam  Vital Signs Last 24 Hrs  T(C): 36.2 (21 Mar 2022 23:32), Max: 37.1 (21 Mar 2022 08:22)  T(F): 97.1 (21 Mar 2022 23:32), Max: 98.7 (21 Mar 2022 08:22)  HR: 79 (21 Mar 2022 23:32) (74 - 113)  BP: 117/60 (21 Mar 2022 23:32) (114/63 - 165/71)  BP(mean): --  RR: 18 (21 Mar 2022 23:32) (14 - 18)  SpO2: 97% (21 Mar 2022 15:19) (87% - 100%)  Gen: AAOx3, NAD  Abd: Soft, nontender, nondistended, BS+  Fundus: Firm, below umbilicus  Lochia: normal  Ext: No calf tenderness, no swelling    Labs:                        11.9   12.18 )-----------( 77       ( 22 Mar 2022 05:59 )             36.2         A/P: s/p , PPD #1, doing well  - continue current management

## 2022-03-22 NOTE — DISCHARGE NOTE OB - MATERIALS PROVIDED
Birth Certificate Instructions Guide to Postpartum Care/[facility] Hearing Screen Program/Back To Sleep Handout/Shaken Baby Prevention Handout/Birth Certificate Instructions

## 2022-03-22 NOTE — DISCHARGE NOTE OB - PATIENT PORTAL LINK FT
You can access the FollowMyHealth Patient Portal offered by [facility] by registering at the following website: [email or web address]. By joining [facility]’s FollowMyHealth portal, you will also be able to view your health information using other applications (apps) compatible with our system.

## 2022-03-22 NOTE — DISCHARGE NOTE OB - HOSPITAL COURSE
22 @ 10:57    HPI:  30yo  at 40w6d EDC 3/15/22 by LMP c/w 1st trimester sonogram presenting with contractions since this AM, q5mins. Denies LOF, VB. Good fetal movement. Pregnancy complicated by gestational thrombocytopenia last platelets on . No other complications this pregnancy. GBS neg (21 Mar 2022 08:47)      PAST MEDICAL & SURGICAL HISTORY:  Gestational thrombocytopenia    No significant past surgical history        POST PARTUM COURSE:          LABS:                        11.9   12.18 )-----------( 77       ( 22 Mar 2022 05:59 )             36.2                         14.2   13.10 )-----------( 104      ( 21 Mar 2022 09:15 )             42.0                   Allergies    No Known Allergies    Intolerances

## 2022-03-22 NOTE — DISCHARGE NOTE OB - CARE PROVIDER_API CALL
Rafita Caballero)  Obstetrics and Gynecology  [address] [place] [address]  Phone: [phone]  Fax: [phone]  Follow Up Time:

## 2022-03-22 NOTE — DISCHARGE NOTE OB - MEDICATION SUMMARY - MEDICATIONS TO TAKE
I will START or STAY ON the medications listed below when I get home from the hospital:    ibuprofen 600 mg oral tablet  -- 1 tab(s) by mouth every 6 hours  -- Indication: For for pain as needed

## 2022-03-24 DIAGNOSIS — Z3A.40 40 WEEKS GESTATION OF PREGNANCY: ICD-10-CM

## 2022-03-24 DIAGNOSIS — Z20.822 CONTACT WITH AND (SUSPECTED) EXPOSURE TO COVID-19: ICD-10-CM

## 2022-03-24 DIAGNOSIS — D69.6 THROMBOCYTOPENIA, UNSPECIFIED: ICD-10-CM

## 2022-09-28 DIAGNOSIS — Z78.9 OTHER SPECIFIED HEALTH STATUS: ICD-10-CM

## 2022-09-28 RX ORDER — ETONOGESTREL AND ETHINYL ESTRADIOL 11.7; 2.7 MG/1; MG/1
0.12-0.015 INSERT, EXTENDED RELEASE VAGINAL
Qty: 3 | Refills: 1 | Status: ACTIVE | COMMUNITY
Start: 2022-09-28 | End: 1900-01-01

## 2023-02-22 RX ORDER — ETONOGESTREL AND ETHINYL ESTRADIOL 11.7; 2.7 MG/1; MG/1
0.12-0.015 INSERT, EXTENDED RELEASE VAGINAL
Qty: 3 | Refills: 0 | Status: ACTIVE | COMMUNITY
Start: 2023-02-22 | End: 1900-01-01

## 2023-03-13 NOTE — DISCHARGE NOTE OB - CONTRAINDICATIONS & PRECAUTIONS (SELECT ALL THAT APPLY)
[place] Border Text: The closure involved the vermilion border. Patient/surrogate refused vaccine...

## 2023-04-21 PROBLEM — O99.119 OTHER DISEASES OF THE BLOOD AND BLOOD-FORMING ORGANS AND CERTAIN DISORDERS INVOLVING THE IMMUNE MECHANISM COMPLICATING PREGNANCY, UNSPECIFIED TRIMESTER: Chronic | Status: ACTIVE | Noted: 2022-03-21

## 2023-05-18 RX ORDER — ETONOGESTREL AND ETHINYL ESTRADIOL 11.7; 2.7 MG/1; MG/1
0.12-0.015 INSERT, EXTENDED RELEASE VAGINAL
Qty: 1 | Refills: 1 | Status: ACTIVE | COMMUNITY
Start: 2023-05-18 | End: 1900-01-01

## 2023-05-18 RX ORDER — ETONOGESTREL AND ETHINYL ESTRADIOL 11.7; 2.7 MG/1; MG/1
0.12-0.015 INSERT, EXTENDED RELEASE VAGINAL
Qty: 1 | Refills: 11 | Status: ACTIVE | COMMUNITY
Start: 2023-05-18 | End: 1900-01-01

## 2023-06-08 ENCOUNTER — APPOINTMENT (OUTPATIENT)
Dept: OBGYN | Facility: CLINIC | Age: 33
End: 2023-06-08
Payer: MEDICAID

## 2023-06-08 VITALS
BODY MASS INDEX: 24.27 KG/M2 | WEIGHT: 137 LBS | SYSTOLIC BLOOD PRESSURE: 117 MMHG | HEIGHT: 63 IN | DIASTOLIC BLOOD PRESSURE: 81 MMHG

## 2023-06-08 DIAGNOSIS — Z01.419 ENCOUNTER FOR GYNECOLOGICAL EXAMINATION (GENERAL) (ROUTINE) W/OUT ABNORMAL FINDINGS: ICD-10-CM

## 2023-06-08 LAB
BILIRUB UR QL STRIP: NORMAL
GLUCOSE UR-MCNC: NORMAL
HCG UR QL: 0.2 EU/DL
HCG UR QL: NEGATIVE
HGB UR QL STRIP.AUTO: NORMAL
KETONES UR-MCNC: NORMAL
LEUKOCYTE ESTERASE UR QL STRIP: NORMAL
NITRITE UR QL STRIP: NORMAL
PH UR STRIP: 5.5
PROT UR STRIP-MCNC: NORMAL
SP GR UR STRIP: 1.01

## 2023-06-08 PROCEDURE — 81003 URINALYSIS AUTO W/O SCOPE: CPT | Mod: QW

## 2023-06-08 PROCEDURE — 81025 URINE PREGNANCY TEST: CPT

## 2023-06-08 PROCEDURE — 99395 PREV VISIT EST AGE 18-39: CPT

## 2023-06-08 RX ORDER — ETONOGESTREL AND ETHINYL ESTRADIOL 11.7; 2.7 MG/1; MG/1
0.12-0.015 INSERT, EXTENDED RELEASE VAGINAL
Qty: 1 | Refills: 6 | Status: ACTIVE | COMMUNITY
Start: 2023-06-08 | End: 1900-01-01

## 2023-06-09 LAB
C TRACH RRNA SPEC QL NAA+PROBE: NOT DETECTED
N GONORRHOEA RRNA SPEC QL NAA+PROBE: NOT DETECTED
SOURCE AMPLIFICATION: NORMAL

## 2023-06-11 NOTE — PLAN
[FreeTextEntry1] : Normal Exam \par -Pap due 2025\par -Breast self exam reviewed \par -Gc Ct sent \par -Will refill her BC rx\par -Discussed strategies for decreased libido and will explore therapies and f/u \par -return visit PRN or 1 year\par

## 2023-06-11 NOTE — HISTORY OF PRESENT ILLNESS
[Patient reported PAP Smear was normal] : Patient reported PAP Smear was normal [FreeTextEntry1] : Pt is a , LMP 5/13/23, Pt here for annual exam no abnormal vaginal bleeding, pelvic pain or vaginal discharge.\par NuvaRing for BC ,had some btb last 2 cycles. Pt reports  some decreased libido. \par \par Med/Surg Hx - n/c \par Ob/Gyn Hx- Nsd x 5, Pap 2022, No Hx lala/hpv, NuvaRing \par Nkda\par No Meds\par Family hx n/c  [PapSmeardate] : 2022

## 2023-07-14 RX ORDER — ETONOGESTREL AND ETHINYL ESTRADIOL 11.7; 2.7 MG/1; MG/1
0.12-0.015 INSERT, EXTENDED RELEASE VAGINAL
Qty: 1 | Refills: 5 | Status: ACTIVE | COMMUNITY
Start: 2023-07-14 | End: 1900-01-01

## 2024-01-04 ENCOUNTER — RX RENEWAL (OUTPATIENT)
Age: 34
End: 2024-01-04

## 2024-01-04 RX ORDER — ETONOGESTREL AND ETHINYL ESTRADIOL .12; .015 MG/D; MG/D
0.12-0.015 INSERT, EXTENDED RELEASE VAGINAL
Qty: 1 | Refills: 6 | Status: ACTIVE | COMMUNITY
Start: 2023-09-07 | End: 1900-01-01

## 2024-10-28 ENCOUNTER — APPOINTMENT (OUTPATIENT)
Dept: OBGYN | Facility: CLINIC | Age: 34
End: 2024-10-28
Payer: MEDICAID

## 2024-10-28 VITALS
SYSTOLIC BLOOD PRESSURE: 120 MMHG | HEIGHT: 63 IN | WEIGHT: 151 LBS | BODY MASS INDEX: 26.75 KG/M2 | HEART RATE: 82 BPM | DIASTOLIC BLOOD PRESSURE: 75 MMHG

## 2024-10-28 DIAGNOSIS — Z00.00 ENCOUNTER FOR GENERAL ADULT MEDICAL EXAMINATION W/OUT ABNORMAL FINDINGS: ICD-10-CM

## 2024-10-28 LAB
BILIRUB UR QL STRIP: NORMAL
GLUCOSE UR-MCNC: NORMAL
HCG UR QL: 0.2 EU/DL
HCG UR QL: NEGATIVE
HGB UR QL STRIP.AUTO: NORMAL
KETONES UR-MCNC: NORMAL
LEUKOCYTE ESTERASE UR QL STRIP: NORMAL
NITRITE UR QL STRIP: NORMAL
PH UR STRIP: 7
PROT UR STRIP-MCNC: 30
SP GR UR STRIP: 1.02

## 2024-10-28 PROCEDURE — 81003 URINALYSIS AUTO W/O SCOPE: CPT | Mod: QW

## 2024-10-28 PROCEDURE — 99395 PREV VISIT EST AGE 18-39: CPT | Mod: 25

## 2024-10-28 PROCEDURE — 81025 URINE PREGNANCY TEST: CPT

## 2024-10-28 PROCEDURE — 99459 PELVIC EXAMINATION: CPT

## 2024-10-29 LAB
C TRACH RRNA SPEC QL NAA+PROBE: NOT DETECTED
FSH SERPL-MCNC: 3.4 IU/L
HCT VFR BLD CALC: 41.3 %
HGB BLD-MCNC: 13.5 G/DL
HPV HIGH+LOW RISK DNA PNL CVX: NOT DETECTED
LH SERPL-ACNC: 6.4 IU/L
MCHC RBC-ENTMCNC: 28.8 PG
MCHC RBC-ENTMCNC: 32.7 GM/DL
MCV RBC AUTO: 88.1 FL
N GONORRHOEA RRNA SPEC QL NAA+PROBE: NOT DETECTED
PLATELET # BLD AUTO: 122 K/UL
RBC # BLD: 4.69 M/UL
RBC # FLD: 12.1 %
SOURCE TP AMPLIFICATION: NORMAL
TSH SERPL-ACNC: 2.06 UIU/ML
WBC # FLD AUTO: 7.78 K/UL

## 2024-10-30 LAB — ESTRADIOL SERPL-MCNC: <5 PG/ML

## 2024-10-30 RX ORDER — ETONOGESTREL AND ETHINYL ESTRADIOL 11.7; 2.7 MG/1; MG/1
0.12-0.015 INSERT, EXTENDED RELEASE VAGINAL
Qty: 3 | Refills: 1 | Status: ACTIVE | COMMUNITY
Start: 2024-10-30 | End: 1900-01-01

## 2024-10-31 LAB — CYTOLOGY CVX/VAG DOC THIN PREP: NORMAL

## 2025-01-15 ENCOUNTER — RX RENEWAL (OUTPATIENT)
Age: 35
End: 2025-01-15

## 2025-06-24 ENCOUNTER — RX RENEWAL (OUTPATIENT)
Age: 35
End: 2025-06-24

## 2025-07-22 NOTE — CHART NOTE - NSCHARTNOTESELECT_GEN_ALL_CORE
"Chief Complaint   Patient presents with    Diabetes     6 month follow up    Hyperlipidemia       SUBJECTIVE  Dionne Bond is a 63 y.o. female presenting today for follow up of her chronic health conditions.    These include DM2, HLD, and depression.     Work is very stressful right now. She is not sleeping. High anxiety.      Outpatient Medications Marked as Taking for the 7/22/25 encounter (Office Visit) with Linnette Izaguirre APRN   Medication Sig Dispense Refill    atorvastatin (LIPITOR) 80 MG tablet TAKE 1 TABLET BY MOUTH EVERY  NIGHT AT BEDTIME 90 tablet 3    Ergocalciferol 10 MCG (400 UNIT) tablet Take 2 tablets by mouth Daily. 90 tablet 3    hydrOXYzine (ATARAX) 25 MG tablet Take 1 tablet by mouth At Night As Needed (insomina). 90 tablet 1    Januvia 100 MG tablet TAKE 1 TABLET BY MOUTH DAILY 90 tablet 3    metFORMIN (GLUCOPHAGE) 1000 MG tablet TAKE 1 TABLET BY MOUTH TWICE  DAILY WITH MEALS 180 tablet 3    Ozempic, 1 MG/DOSE, 4 MG/3ML solution pen-injector INJECT SUBCUTANEOUSLY 1 MG INTO  THE APPROPRIATE AREA EVERY WEEK  AS DIRECTED 9 mL 3    sertraline (ZOLOFT) 100 MG tablet TAKE 1 AND 1/2 TABLETS BY MOUTH  DAILY 135 tablet 3         The following portions of the patient's history were reviewed and updated as appropriate: allergies, current medications, past family history, past medical history, past social history, past surgical history and problem list.    Review of Systems    Objective   Vitals:    07/22/25 0807   BP: 92/64   BP Location: Left arm   Patient Position: Sitting   Cuff Size: Adult   Pulse: 72   Temp: 97.7 °F (36.5 °C)   TempSrc: Infrared   SpO2: 98%   Weight: 57.1 kg (125 lb 12.8 oz)   Height: 167.6 cm (66\")       BP Readings from Last 3 Encounters:   07/22/25 92/64   01/15/25 118/74   01/14/25 102/70        Wt Readings from Last 3 Encounters:   07/22/25 57.1 kg (125 lb 12.8 oz)   01/15/25 60.3 kg (133 lb)   01/14/25 60.1 kg (132 lb 6.4 oz)        Body mass index is 20.3 kg/m².  Nursing " Event Note/chart review notes and vitals reviewed.    Physical Exam  Constitutional:       General: She is not in acute distress.     Appearance: She is well-developed.   Cardiovascular:      Rate and Rhythm: Regular rhythm.      Pulses:           Dorsalis pedis pulses are 2+ on the right side and 2+ on the left side.        Posterior tibial pulses are 2+ on the right side and 2+ on the left side.      Heart sounds: S1 normal and S2 normal.   Pulmonary:      Effort: Pulmonary effort is normal.      Breath sounds: Normal breath sounds.   Musculoskeletal:      Right foot: Normal range of motion. No deformity.      Left foot: Normal range of motion. No deformity.   Feet:      Right foot:      Protective Sensation: 10 sites tested.  10 sites sensed.      Skin integrity: Skin integrity normal.      Toenail Condition: Right toenails are normal.      Left foot:      Protective Sensation: 10 sites tested.  10 sites sensed.      Skin integrity: Skin integrity normal.      Toenail Condition: Left toenails are normal.   Neurological:      Mental Status: She is alert and oriented to person, place, and time.   Psychiatric:         Attention and Perception: She is attentive.         Behavior: Behavior normal.         Thought Content: Thought content normal.           Data Reviewed:  Recent Results (from the past 4 weeks)   Lipid Panel With / Chol / HDL Ratio    Collection Time: 07/14/25  8:57 AM    Specimen: Blood   Result Value Ref Range    Total Cholesterol 159 0 - 200 mg/dL    Triglycerides 55 0 - 150 mg/dL    HDL Cholesterol 75 (H) 40 - 60 mg/dL    VLDL Cholesterol Rashid 11 5 - 40 mg/dL    LDL Chol Calc (NIH) 73 0 - 100 mg/dL    Chol/HDL Ratio 2.12    Hemoglobin A1c    Collection Time: 07/14/25  8:57 AM    Specimen: Blood   Result Value Ref Range    Hemoglobin A1C 5.60 4.80 - 5.60 %   Comprehensive Metabolic Panel    Collection Time: 07/14/25  8:57 AM    Specimen: Blood   Result Value Ref Range    Glucose 94 65 - 99 mg/dL    BUN 9.0 8.0 - 23.0 mg/dL     Creatinine 0.72 0.57 - 1.00 mg/dL    EGFR Result 94.1 >60.0 mL/min/1.73    BUN/Creatinine Ratio 12.5 7.0 - 25.0    Sodium 140 136 - 145 mmol/L    Potassium 4.1 3.5 - 5.2 mmol/L    Chloride 103 98 - 107 mmol/L    Total CO2 27.3 22.0 - 29.0 mmol/L    Calcium 9.6 8.6 - 10.5 mg/dL    Total Protein 6.8 6.0 - 8.5 g/dL    Albumin 4.5 3.5 - 5.2 g/dL    Globulin 2.3 gm/dL    A/G Ratio 2.0 g/dL    Total Bilirubin 0.3 0.0 - 1.2 mg/dL    Alkaline Phosphatase 71 39 - 117 U/L    AST (SGOT) 28 1 - 32 U/L    ALT (SGPT) 15 1 - 33 U/L         Assessment and Plan:       Type 2 diabetes mellitus without complication, without long-term current use of insulin  - encouraged to schedule eye exam  - continue current regimen         Mixed hyperlipidemia  - continue current regimen         Vitamin D deficiency  - continue current supp       Reactive depression  - continue sertraline  - add Wellbutrin    Orders:    buPROPion XL (Wellbutrin XL) 150 MG 24 hr tablet; Take 1 tablet by mouth Daily.    Anxiety  - continue sertraline  - add Wellbutriin    Orders:    buPROPion XL (Wellbutrin XL) 150 MG 24 hr tablet; Take 1 tablet by mouth Daily.    Encounter for diabetic foot exam         Screening for malignant neoplasm of colon    Orders:    Ambulatory Referral For Screening Colonoscopy    Personal history of colonic polyps    Orders:    Ambulatory Referral For Screening Colonoscopy        Medications, including side effects, were discussed with the patient. Patient verbalized understanding.  The plan of care was discussed. All questions were answered. Patient verbalized understanding.      Return in about 3 months (around 10/22/2025).